# Patient Record
Sex: MALE | Race: ASIAN | URBAN - METROPOLITAN AREA
[De-identification: names, ages, dates, MRNs, and addresses within clinical notes are randomized per-mention and may not be internally consistent; named-entity substitution may affect disease eponyms.]

---

## 2020-05-01 ENCOUNTER — APPOINTMENT (OUTPATIENT)
Dept: GENERAL RADIOLOGY | Facility: HOSPITAL | Age: 30
End: 2020-05-01
Attending: EMERGENCY MEDICINE
Payer: OTHER GOVERNMENT

## 2020-05-01 ENCOUNTER — HOSPITAL ENCOUNTER (EMERGENCY)
Facility: HOSPITAL | Age: 30
Discharge: HOME | End: 2020-05-02
Attending: EMERGENCY MEDICINE
Payer: OTHER GOVERNMENT

## 2020-05-01 VITALS
SYSTOLIC BLOOD PRESSURE: 136 MMHG | OXYGEN SATURATION: 95 % | DIASTOLIC BLOOD PRESSURE: 64 MMHG | HEART RATE: 89 BPM | BODY MASS INDEX: 29.86 KG/M2 | HEIGHT: 68 IN | WEIGHT: 197 LBS | TEMPERATURE: 100 F | RESPIRATION RATE: 18 BRPM

## 2020-05-01 DIAGNOSIS — U07.1 COVID-19 VIRUS INFECTION: Primary | ICD-10-CM

## 2020-05-01 PROCEDURE — 71045 X-RAY EXAM CHEST 1 VIEW: CPT | Performed by: EMERGENCY MEDICINE

## 2020-05-01 PROCEDURE — 99453 REM MNTR PHYSIOL PARAM SETUP: CPT

## 2020-05-01 PROCEDURE — 99284 EMERGENCY DEPT VISIT MOD MDM: CPT

## 2020-05-01 RX ORDER — DOXYCYCLINE HYCLATE 100 MG/1
100 CAPSULE ORAL 2 TIMES DAILY
Qty: 20 CAPSULE | Refills: 0 | Status: SHIPPED | OUTPATIENT
Start: 2020-05-01 | End: 2020-05-11

## 2020-05-02 NOTE — ED INITIAL ASSESSMENT (HPI)
Patient here, known COVID positive c/o of increased SOB.   Patient last took tylenol at [facility].

## 2022-05-05 NOTE — ED PROVIDER NOTES
Patient Seen in: [facility] Emergency Department      History   Patient presents with:  Dyspnea CRISTA SOB  Cough/URI    Stated Complaint: cough sob    HPI    Was diagnosed with COVID-19 a few days ago via swab. Has had increasing fatigue, weakness. Start Maxitrol QID OS. I have personally visualized the Radiology studies and agree with interpretation from radiology. Xr Chest Ap Portable  (cpt=71045)    Result Date: 5/1/2020  PROCEDURE:  XR CHEST AP PORTABLE  (CPT=71045)  TECHNIQUE:  AP chest radiograph was obtained. of the instructions. Medications Prescribed:  Discharge Medication List as of 5/1/2020 11:54 PM    START taking these medications    Doxycycline Hyclate 100 MG Oral Cap  Take 1 capsule (100 mg total) by mouth 2 (two) times daily for 10 days. , Sandi

## 2024-07-13 ENCOUNTER — LAB ENCOUNTER (OUTPATIENT)
Dept: LAB | Age: 34
End: 2024-07-13
Attending: INTERNAL MEDICINE
Payer: COMMERCIAL

## 2024-07-13 ENCOUNTER — OFFICE VISIT (OUTPATIENT)
Dept: INTERNAL MEDICINE CLINIC | Facility: CLINIC | Age: 34
End: 2024-07-13
Payer: COMMERCIAL

## 2024-07-13 VITALS
OXYGEN SATURATION: 97 % | DIASTOLIC BLOOD PRESSURE: 74 MMHG | RESPIRATION RATE: 14 BRPM | SYSTOLIC BLOOD PRESSURE: 110 MMHG | TEMPERATURE: 98 F | HEART RATE: 61 BPM | HEIGHT: 67.52 IN | WEIGHT: 192 LBS | BODY MASS INDEX: 29.44 KG/M2

## 2024-07-13 DIAGNOSIS — Z00.00 ENCOUNTER FOR PREVENTATIVE ADULT HEALTH CARE EXAMINATION: Primary | ICD-10-CM

## 2024-07-13 DIAGNOSIS — Z00.00 ENCOUNTER FOR PREVENTATIVE ADULT HEALTH CARE EXAMINATION: ICD-10-CM

## 2024-07-13 DIAGNOSIS — R73.03 PREDIABETES: ICD-10-CM

## 2024-07-13 DIAGNOSIS — E78.2 MIXED HYPERLIPIDEMIA: ICD-10-CM

## 2024-07-13 DIAGNOSIS — Z23 NEED FOR DIPHTHERIA-TETANUS-PERTUSSIS (TDAP) VACCINE: ICD-10-CM

## 2024-07-13 PROBLEM — G47.00 INSOMNIA: Status: ACTIVE | Noted: 2023-02-21

## 2024-07-13 LAB
ALBUMIN SERPL-MCNC: 4.4 G/DL (ref 3.2–4.8)
ALBUMIN/GLOB SERPL: 1.6 {RATIO} (ref 1–2)
ALP LIVER SERPL-CCNC: 70 U/L
ALT SERPL-CCNC: 12 U/L
ANION GAP SERPL CALC-SCNC: 3 MMOL/L (ref 0–18)
AST SERPL-CCNC: 12 U/L (ref ?–34)
BASOPHILS # BLD AUTO: 0.06 X10(3) UL (ref 0–0.2)
BASOPHILS NFR BLD AUTO: 0.9 %
BILIRUB SERPL-MCNC: 0.9 MG/DL (ref 0.3–1.2)
BUN BLD-MCNC: 11 MG/DL (ref 9–23)
BUN/CREAT SERPL: 9.4 (ref 10–20)
CALCIUM BLD-MCNC: 9.2 MG/DL (ref 8.7–10.4)
CHLORIDE SERPL-SCNC: 108 MMOL/L (ref 98–112)
CHOLEST SERPL-MCNC: 148 MG/DL (ref ?–200)
CO2 SERPL-SCNC: 30 MMOL/L (ref 21–32)
CREAT BLD-MCNC: 1.17 MG/DL
DEPRECATED RDW RBC AUTO: 45 FL (ref 35.1–46.3)
EGFRCR SERPLBLD CKD-EPI 2021: 85 ML/MIN/1.73M2 (ref 60–?)
EOSINOPHIL # BLD AUTO: 0.12 X10(3) UL (ref 0–0.7)
EOSINOPHIL NFR BLD AUTO: 1.8 %
ERYTHROCYTE [DISTWIDTH] IN BLOOD BY AUTOMATED COUNT: 12.1 % (ref 11–15)
EST. AVERAGE GLUCOSE BLD GHB EST-MCNC: 120 MG/DL (ref 68–126)
FASTING PATIENT LIPID ANSWER: YES
FASTING STATUS PATIENT QL REPORTED: YES
GLOBULIN PLAS-MCNC: 2.7 G/DL (ref 2–3.5)
GLUCOSE BLD-MCNC: 113 MG/DL (ref 70–99)
HBA1C MFR BLD: 5.8 % (ref ?–5.7)
HCT VFR BLD AUTO: 38.4 %
HDLC SERPL-MCNC: 50 MG/DL (ref 40–59)
HGB BLD-MCNC: 13.3 G/DL
IMM GRANULOCYTES # BLD AUTO: 0.02 X10(3) UL (ref 0–1)
IMM GRANULOCYTES NFR BLD: 0.3 %
LDLC SERPL CALC-MCNC: 76 MG/DL (ref ?–100)
LYMPHOCYTES # BLD AUTO: 2.26 X10(3) UL (ref 1–4)
LYMPHOCYTES NFR BLD AUTO: 33.5 %
MCH RBC QN AUTO: 35 PG (ref 26–34)
MCHC RBC AUTO-ENTMCNC: 34.6 G/DL (ref 31–37)
MCV RBC AUTO: 101.1 FL
MONOCYTES # BLD AUTO: 0.46 X10(3) UL (ref 0.1–1)
MONOCYTES NFR BLD AUTO: 6.8 %
NEUTROPHILS # BLD AUTO: 3.82 X10 (3) UL (ref 1.5–7.7)
NEUTROPHILS # BLD AUTO: 3.82 X10(3) UL (ref 1.5–7.7)
NEUTROPHILS NFR BLD AUTO: 56.7 %
NONHDLC SERPL-MCNC: 98 MG/DL (ref ?–130)
OSMOLALITY SERPL CALC.SUM OF ELEC: 292 MOSM/KG (ref 275–295)
PLATELET # BLD AUTO: 302 10(3)UL (ref 150–450)
POTASSIUM SERPL-SCNC: 4.4 MMOL/L (ref 3.5–5.1)
PROT SERPL-MCNC: 7.1 G/DL (ref 5.7–8.2)
RBC # BLD AUTO: 3.8 X10(6)UL
SODIUM SERPL-SCNC: 141 MMOL/L (ref 136–145)
TRIGL SERPL-MCNC: 123 MG/DL (ref 30–149)
VLDLC SERPL CALC-MCNC: 19 MG/DL (ref 0–30)
WBC # BLD AUTO: 6.7 X10(3) UL (ref 4–11)

## 2024-07-13 PROCEDURE — 85025 COMPLETE CBC W/AUTO DIFF WBC: CPT

## 2024-07-13 PROCEDURE — 80053 COMPREHEN METABOLIC PANEL: CPT

## 2024-07-13 PROCEDURE — 80061 LIPID PANEL: CPT

## 2024-07-13 PROCEDURE — 83036 HEMOGLOBIN GLYCOSYLATED A1C: CPT

## 2024-07-13 NOTE — PATIENT INSTRUCTIONS
- Get blood tests done today  - Tetanus shot given today.  Will be due for next tetanus shot in 10 years  - Would target goal weight in the 180's    It was a pleasure seeing you in the clinic today.  Thank you for choosing the [facility] office for your healthcare needs. Please call at [phone] with any questions or concerns.    Etta Hines MD

## 2024-07-13 NOTE — PROGRESS NOTES
Cruz Talley is a 33 year old male.   HPI:   Patient presents for CPX/wellness examination and to establish care.    Diet: Consistent - tries to avoid fatty foods.  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   Exercise: Works night shifts - hard to work out during the daytime  Vision: No corrective lenses  Dental: Last cleaning was in April    Acute issues:  Had cold/URI symptoms a month ago - resolved now.    Chronic issues:  Had screening labs last spring - blood sugar/A1c in prediabetes range, lipids mildly elevated.    Surgeries: None  Family hx: Mother with HTN, HLP; father with HTN, CAD/HLP    Past medical, family, surgical and social history were reviewed and updated in chart.  REVIEW OF SYSTEMS:   GENERAL/ const: no fevers/chills, no unintentional weight loss  SKIN: denies any unusual skin lesions  EYES:no vision problems  HEENT: denies sinus pain or sinus tenderness  LUNGS: denies shortness of breath   CARDIOVASCULAR: denies chest pain  GI: denies nausea/emesis/ abdominal pain diarrhea constipation  : denies dysuria   MUSCULOSKELETAL: no arthralgias  NEURO: denies headaches  PSYCHIATRIC: denies issues  ENDOCRINE: no hot/cold intolerance  ALLERGY: No Known Allergies  PAST HISTORY:   No current outpatient medications on file.  Medical:  has no past medical history on file.  Surgical:  has no past surgical history on file.  Family: family history includes Heart Disorder in his father; Hypertension in his father and mother; Lipids in his father and mother.  Social:  reports that he has never smoked. He has never used smokeless tobacco. He reports that he does not currently use alcohol. He reports that he does not currently use drugs.  Wt Readings from Last 6 Encounters:   07/13/24 192 lb (87.1 kg)   05/01/20 197 lb (89.4 kg)     EXAM:   /74 (BP Location: Left arm, Patient Position: Sitting, Cuff Size: adult)   Pulse 61   Temp 98.1 °F (36.7 °C) (Temporal)   Resp 14   Ht 5' 7.52\"  (1.715 m)   Wt 192 lb (87.1 kg)   SpO2 97%   BMI 29.61 kg/m²   GENERAL: Alert and oriented, well developed, well nourished,in no apparent distress  SKIN: no rashes,no suspicious lesions  HEENT: atraumatic, PERRLA, EOMI, normal lid and conjunctiva, normal external canals and tympanic membranes bilaterally  NECK: supple, no jvd, no thyromegaly, no palpable/tender cervical lymphadenopathy  LUNGS: clear to auscultation bilaterally, no wheezing/rubs  CARDIO: RRR without murmurs.  No clubbing, cyanosis or edema.  GI: soft non tender nondistended no hepatosplenomegaly, bowel sounds throughout  NEURO: CN II-XII intact, 5/5 strength all extremities  MS: Full ROM, no joint pain  PSYCH: pleasant, appropriate mood and affect  ASSESSMENT AND PLAN:   1.  Encounter for preventative adult health examination  2. Need for diphtheria-tetanus-pertussis (Tdap) vaccine  Age appropriate health guidance and counseling provided.  Screening labs ordered as below.  Body mass index is 29.61 kg/m².  TDaP administered in office today.  - CBC With Differential With Platelet; Future  - Comp Metabolic Panel (14); Future  - Hemoglobin A1C; Future  - Lipid Panel; Future  - TdaP (Boostrix) Vaccine (> 7 Y)    3. Prediabetes  4. Mixed hyperlipidemia  Lifestyle controlled.  Labs ordered as below.  - Comp Metabolic Panel (14); Future  - Hemoglobin A1C; Future  - Lipid Panel; Future    Patient Care Team:  Etta Hines MD as PCP - General (Internal Medicine)  The patient indicates understanding of these issues and agrees to the plan.  The patient is asked to return to clinic in 12 months for follow up on chronic issues/CPX, or earlier if acute issues arise.    Etta Hines MD

## 2024-07-14 PROBLEM — R73.03 PREDIABETES: Status: ACTIVE | Noted: 2024-07-14

## 2024-12-10 ENCOUNTER — APPOINTMENT (OUTPATIENT)
Dept: GENERAL RADIOLOGY | Facility: HOSPITAL | Age: 34
End: 2024-12-10
Payer: COMMERCIAL

## 2024-12-10 ENCOUNTER — APPOINTMENT (OUTPATIENT)
Dept: CT IMAGING | Facility: HOSPITAL | Age: 34
End: 2024-12-10
Attending: EMERGENCY MEDICINE
Payer: COMMERCIAL

## 2024-12-10 ENCOUNTER — HOSPITAL ENCOUNTER (EMERGENCY)
Facility: HOSPITAL | Age: 34
Discharge: HOME | End: 2024-12-11
Attending: EMERGENCY MEDICINE
Payer: COMMERCIAL

## 2024-12-10 DIAGNOSIS — R03.0 ELEVATED BLOOD PRESSURE READING: Primary | ICD-10-CM

## 2024-12-10 LAB
ALBUMIN SERPL-MCNC: 4.3 G/DL (ref 3.2–4.8)
ALBUMIN/GLOB SERPL: 1.5 {RATIO} (ref 1–2)
ALP LIVER SERPL-CCNC: 70 U/L
ALT SERPL-CCNC: 15 U/L
ANION GAP SERPL CALC-SCNC: 5 MMOL/L (ref 0–18)
AST SERPL-CCNC: 13 U/L (ref ?–34)
BASOPHILS # BLD AUTO: 0.05 X10(3) UL (ref 0–0.2)
BASOPHILS NFR BLD AUTO: 0.7 %
BILIRUB SERPL-MCNC: 0.5 MG/DL (ref 0.3–1.2)
BUN BLD-MCNC: 12 MG/DL (ref 9–23)
CALCIUM BLD-MCNC: 9.8 MG/DL (ref 8.7–10.4)
CHLORIDE SERPL-SCNC: 107 MMOL/L (ref 98–112)
CO2 SERPL-SCNC: 27 MMOL/L (ref 21–32)
CREAT BLD-MCNC: 1.08 MG/DL
EGFRCR SERPLBLD CKD-EPI 2021: 93 ML/MIN/1.73M2 (ref 60–?)
EOSINOPHIL # BLD AUTO: 0.11 X10(3) UL (ref 0–0.7)
EOSINOPHIL NFR BLD AUTO: 1.5 %
ERYTHROCYTE [DISTWIDTH] IN BLOOD BY AUTOMATED COUNT: 12.2 %
GLOBULIN PLAS-MCNC: 2.9 G/DL (ref 2–3.5)
GLUCOSE BLD-MCNC: 98 MG/DL (ref 70–99)
HCT VFR BLD AUTO: 41.5 %
HGB BLD-MCNC: 14.1 G/DL
IMM GRANULOCYTES # BLD AUTO: 0.02 X10(3) UL (ref 0–1)
IMM GRANULOCYTES NFR BLD: 0.3 %
LYMPHOCYTES # BLD AUTO: 2.15 X10(3) UL (ref 1–4)
LYMPHOCYTES NFR BLD AUTO: 29.1 %
MCH RBC QN AUTO: 34.3 PG (ref 26–34)
MCHC RBC AUTO-ENTMCNC: 34 G/DL (ref 31–37)
MCV RBC AUTO: 101 FL
MONOCYTES # BLD AUTO: 0.51 X10(3) UL (ref 0.1–1)
MONOCYTES NFR BLD AUTO: 6.9 %
NEUTROPHILS # BLD AUTO: 4.54 X10 (3) UL (ref 1.5–7.7)
NEUTROPHILS # BLD AUTO: 4.54 X10(3) UL (ref 1.5–7.7)
NEUTROPHILS NFR BLD AUTO: 61.5 %
OSMOLALITY SERPL CALC.SUM OF ELEC: 288 MOSM/KG (ref 275–295)
PLATELET # BLD AUTO: 290 10(3)UL (ref 150–450)
POTASSIUM SERPL-SCNC: 4.2 MMOL/L (ref 3.5–5.1)
PROT SERPL-MCNC: 7.2 G/DL (ref 5.7–8.2)
RBC # BLD AUTO: 4.11 X10(6)UL
SODIUM SERPL-SCNC: 139 MMOL/L (ref 136–145)
TROPONIN I SERPL HS-MCNC: 3 NG/L
WBC # BLD AUTO: 7.4 X10(3) UL (ref 4–11)

## 2024-12-10 PROCEDURE — 80053 COMPREHEN METABOLIC PANEL: CPT | Performed by: EMERGENCY MEDICINE

## 2024-12-10 PROCEDURE — 99285 EMERGENCY DEPT VISIT HI MDM: CPT

## 2024-12-10 PROCEDURE — 36415 COLL VENOUS BLD VENIPUNCTURE: CPT

## 2024-12-10 PROCEDURE — 93005 ELECTROCARDIOGRAM TRACING: CPT

## 2024-12-10 PROCEDURE — 80053 COMPREHEN METABOLIC PANEL: CPT

## 2024-12-10 PROCEDURE — 85025 COMPLETE CBC W/AUTO DIFF WBC: CPT | Performed by: EMERGENCY MEDICINE

## 2024-12-10 PROCEDURE — 93010 ELECTROCARDIOGRAM REPORT: CPT

## 2024-12-10 PROCEDURE — 71045 X-RAY EXAM CHEST 1 VIEW: CPT

## 2024-12-10 PROCEDURE — 70450 CT HEAD/BRAIN W/O DYE: CPT | Performed by: EMERGENCY MEDICINE

## 2024-12-10 PROCEDURE — 84484 ASSAY OF TROPONIN QUANT: CPT | Performed by: EMERGENCY MEDICINE

## 2024-12-10 PROCEDURE — 85025 COMPLETE CBC W/AUTO DIFF WBC: CPT

## 2024-12-10 PROCEDURE — 84484 ASSAY OF TROPONIN QUANT: CPT

## 2024-12-10 RX ORDER — LOSARTAN POTASSIUM 25 MG/1
25 TABLET ORAL DAILY
Qty: 30 TABLET | Refills: 0 | Status: SHIPPED | OUTPATIENT
Start: 2024-12-10 | End: 2025-01-09

## 2024-12-11 VITALS
SYSTOLIC BLOOD PRESSURE: 119 MMHG | RESPIRATION RATE: 12 BRPM | BODY MASS INDEX: 28.79 KG/M2 | WEIGHT: 190 LBS | HEIGHT: 68 IN | HEART RATE: 51 BPM | TEMPERATURE: 97 F | DIASTOLIC BLOOD PRESSURE: 69 MMHG | OXYGEN SATURATION: 100 %

## 2024-12-11 NOTE — DISCHARGE INSTRUCTIONS
You may take your blood pressure medication that has been prescribed if you find if you have elevated blood pressure readings over 160 systolic

## 2024-12-11 NOTE — ED PROVIDER NOTES
Patient Seen in: [facility] Emergency Department      History     Chief Complaint   Patient presents with    Hypertension     Stated Complaint: Aches on left side/ HTN    Subjective:   Patient is a 34-year-old male presents emergency room for evaluation of elevated blood pressure reading today at work.  Patient had been feeling lightheaded and had checked his blood pressure at work with an automatic cuff reading over 170.  Patient has not seen his doctor in July of this year that time he had a blood pressure normally runs in the 120s.  Patient's blood pressure currently is 151/80.  He is in no distress.  But he has been under a great deal of stress lately with working night shift and changing computer etc.    The history is provided by the patient.             Objective:     History reviewed. No pertinent past medical history.           History reviewed. No pertinent surgical history.             Social History     Socioeconomic History    Marital status: Single   Tobacco Use    Smoking status: Never    Smokeless tobacco: Never   Vaping Use    Vaping status: Never Used   Substance and Sexual Activity    Alcohol use: Not Currently    Drug use: Not Currently     Social Drivers of Health     Financial Resource Strain: Not on File (2/21/2023)    Received from [facility]    Financial Resource Strain     Financial Resource Strain: 0   Food Insecurity: Not on File (9/26/2024)    Received from [facility]    Food Insecurity     Food: 0   Transportation Needs: Not on File (2/21/2023)    Received from [facility]    Transportation Needs     Transportation: 0   Physical Activity: Not on File (2/21/2023)    Received from [facility]    Physical Activity     Physical Activity: 0   Stress: Not on File (2/21/2023)    Received from [facility]    Stress     Stress: 0   Social Connections: Not on File (9/14/2024)    Received from [facility]    Social Connections     Connectedness: 0   Housing Stability: Not on File (2/21/2023)    Received from [facility]     Housing Stability     Housin                  Physical Exam     ED Triage Vitals [12/10/24 1939]   /78   Pulse 62   Resp 16   Temp 97.2 °F (36.2 °C)   Temp src    SpO2 98 %   O2 Device None (Room air)       Current Vitals:   Vital Signs  BP: 124/64  Pulse: 54  Resp: 14  Temp: 97.2 °F (36.2 °C)  MAP (mmHg): 81    Oxygen Therapy  SpO2: 97 %  O2 Device: None (Room air)        Physical Exam  Vitals and nursing note reviewed.   Constitutional:       General: He is not in acute distress.     Appearance: Normal appearance. He is normal weight. He is not toxic-appearing.   HENT:      Head: Normocephalic and atraumatic.      Mouth/Throat:      Mouth: Mucous membranes are moist.   Eyes:      Extraocular Movements: Extraocular movements intact.      Pupils: Pupils are equal, round, and reactive to light.   Cardiovascular:      Rate and Rhythm: Normal rate and regular rhythm.      Pulses: Normal pulses.   Pulmonary:      Effort: Pulmonary effort is normal.      Breath sounds: Normal breath sounds.   Abdominal:      General: Bowel sounds are normal. There is no distension.      Palpations: Abdomen is soft.      Tenderness: There is no abdominal tenderness.   Musculoskeletal:         General: Normal range of motion.   Skin:     General: Skin is warm.      Capillary Refill: Capillary refill takes less than 2 seconds.   Neurological:      General: No focal deficit present.      Mental Status: He is alert and oriented to person, place, and time.   Psychiatric:         Mood and Affect: Mood normal.         Behavior: Behavior normal.       56    ED Course     Labs Reviewed   CBC WITH DIFFERENTIAL WITH PLATELET - Abnormal; Notable for the following components:       Result Value    RBC 4.11 (*)     .0 (*)     MCH 34.3 (*)     All other components within normal limits   COMP METABOLIC PANEL (14) - Normal   TROPONIN I HIGH SENSITIVITY - Normal     EKG    Rate, intervals and axes as noted on EKG Report.  Rate: 56  Rhythm:  Sinus Rhythm bradycardia  Reading: Sinus bradycardia no ST elevation [place] interval of 138 S84 QTc of 353 with axes 52/58/31                XR CHEST AP PORTABLE  (CPT=71045)    Result Date: 12/10/2024  PROCEDURE:  XR CHEST AP PORTABLE  (CPT=71045)  TECHNIQUE:  AP chest radiograph was obtained.  COMPARISON:  [facility] , XR, XR CHEST AP PORTABLE  (CPT=71045), 5/01/2020, 10:29 PM.  INDICATIONS:  Aches on left side/ HTN  PATIENT STATED HISTORY: (As transcribed by Technologist)     FINDINGS:  The heart is normal in size.  The lungs are clear.  There are no pleural effusions.  The bones are unremarkable.            CONCLUSION:  No acute disease.    LOCATION:  [facility]      Dictated by (CST): Will Abdullahi MD on 12/10/2024 at 9:20 PM     Finalized by (CST): Will Abdullahi MD on 12/10/2024 at 9:20 PM        CT scan shows no evidence of acute intracranial abnormality no hemorrhage or mass ventricles sulci gray-white matter and bones are unremarkable.  Sinuses are unremarkable.  No CT criteria for an acute ischemic event however CT has diminished sensitivity for hyperacute stroke.     MDM      Social -negative tobacco, negative etoh, negative drugs  Family History-parents both with history of high blood pressure  Past Medical History-no significant past medical history    Differential diagnosis before testing included hypertension, anxiety, stress reaction, electrolyte abnormality, intracranial hemorrhage, acute coronary syndrome    Co-morbidities that add to the complexity of management include: None    Testing ordered during this visit included CT scan chest x-ray EKG 1 set of cardiac enzymes baseline labs    Radiographic images  I personally reviewed the radiographs and my individual interpretation shows chest x-ray and CT showed no acute process  I also reviewed the official reports that showed XR CHEST AP PORTABLE  (CPT=71045)    Result Date: 12/10/2024  PROCEDURE:  XR CHEST AP PORTABLE  (CPT=71045)  TECHNIQUE:  AP chest  radiograph was obtained.  COMPARISON:  [facility] , XR, XR CHEST AP PORTABLE  (CPT=71045), 5/01/2020, 10:29 PM.  INDICATIONS:  Aches on left side/ HTN  PATIENT STATED HISTORY: (As transcribed by Technologist)     FINDINGS:  The heart is normal in size.  The lungs are clear.  There are no pleural effusions.  The bones are unremarkable.            CONCLUSION:  No acute disease.    LOCATION:  [facility]      Dictated by (CST): Will Abdullahi MD on 12/10/2024 at 9:20 PM     Finalized by (CST): Will Abdullahi MD on 12/10/2024 at 9:20 PM        CT scan shows no evidence of acute intracranial abnormality no hemorrhage or mass ventricles sulci gray-white matter and bones are unremarkable.  Sinuses are unremarkable.  No CT criteria for an acute ischemic event however CT has diminished sensitivity for hyperacute stroke.       External chart review showed review of Care Everywhere in epic system shows no related comorbidities to current presentation, patient's most recent blood pressure that was checked by his primary doctor in July of this year was a systolic of 110    History obtained by an independent source included from patient,    Discussion of management with patient    Social determinants of health that affect care include patient reluctant to start blood pressure medication      Medications Provided: Losartan    Course of Events during Emergency Room Visit include 34-year-old male presents emergency room with episode of chest heaviness and dizziness that occurred today while at work.  Patient's blood pressure was measured with an automatic wrist cuff at that time stromally 170 systolic.  Areas to 150s.  Advised patient that iMac cuffs can be quite inaccurate particularly wrist cuffs.  I would recommend close follow-up with his primary doctor.  He is reluctant starting blood pressure medicine I would be happy to give him 1 for emergencies that he can discuss with his primary doctor in the meantime.    I rechecked the patient we  are still waiting for the CT to be read.  As of 214 the results are back.  Patient has no acute findings.  He is feeling much better blood pressure is down to 1/24/1963.  Be given a prescription for losartan that if the has elevation in his blood pressure readings at home he can take if his systolic is greater than 160.  Patient expresses understanding of these instructions.  He will follow-up with his primary care physician.      Disposition:        Discharge  I have discussed with the patient the results of test, differential diagnosis, treatment plan, warning signs and symptoms which should prompt immediate return.  They expressed understanding of these instructions and agrees to the following plan provided.  They were given written discharge instructions and agrees to return for any concerns and voiced understanding and all questions were answered.           Medical Decision Making      Disposition and Plan     Clinical Impression:  1. Elevated blood pressure reading         Disposition:  Discharge  12/11/2024  2:15 am    Follow-up:  Etta Hines MD  [address] [facility] [address]  [phone]    Schedule an appointment as soon as possible for a visit            Medications Prescribed:  Current Discharge Medication List        START taking these medications    Details   losartan 25 MG Oral Tab Take 1 tablet (25 mg total) by mouth daily.  Qty: 30 tablet, Refills: 0                 Supplementary Documentation:

## 2024-12-11 NOTE — ED INITIAL ASSESSMENT (HPI)
Patient reports he was driving and he felt an episode of dizziness and left sided chest heaviness that radiated into his left neck. This happened at noon today and he states he took his BP shortly after and it was 170 systolic. Also reports he had an episode of bright red blood in his stool on Sunday. Denies any blood thinners.denies any numbness or tingling, no facial droop noted, patient ambulatory with a steady gait, speech is clear.

## 2024-12-12 ENCOUNTER — OFFICE VISIT (OUTPATIENT)
Dept: INTERNAL MEDICINE CLINIC | Facility: CLINIC | Age: 34
End: 2024-12-12
Payer: COMMERCIAL

## 2024-12-12 VITALS
TEMPERATURE: 98 F | BODY MASS INDEX: 30.92 KG/M2 | DIASTOLIC BLOOD PRESSURE: 70 MMHG | HEIGHT: 68 IN | OXYGEN SATURATION: 100 % | HEART RATE: 70 BPM | WEIGHT: 204 LBS | SYSTOLIC BLOOD PRESSURE: 120 MMHG

## 2024-12-12 DIAGNOSIS — I10 PRIMARY HYPERTENSION: Primary | ICD-10-CM

## 2024-12-12 LAB
ATRIAL RATE: 56 BPM
P AXIS: 52 DEGREES
P-R INTERVAL: 138 MS
Q-T INTERVAL: 366 MS
QRS DURATION: 84 MS
QTC CALCULATION (BEZET): 353 MS
R AXIS: 58 DEGREES
T AXIS: 31 DEGREES
VENTRICULAR RATE: 56 BPM

## 2024-12-12 NOTE — PROGRESS NOTES
Subjective:   Cruz Talley is a 34 year old male who presents for hospital follow up.   He was discharged from Inpatient hospital, [facility] to Home   Admit Date: 12/10/11  Discharge Date: 12/10/11  Hospital Discharge Diagnosis: Hypertension    Interactive contact within 2 business days post discharge first initiated on Date: yes    I accessed [facility] and/or Care Everywhere and personally reviewed the following for the recent hospitalization: provider notes, consults, summaries, labs and other test results and the pertinent findings are documented below.     HPI:     The patient is 34-year-old man went to the emergency room because he felt nauseous, had diplopia And had high blood pressure.  The patient had CT scan of the brain and that was unremarkable.  Blood pressure initially was in 170s but later in the emergency room it was in 150s.  The patient was prescribed losartan 25 mg which he did not take it.  However, patient's blood pressure is in 120s since he was discharged.  Today, the patient's blood pressure is normal as well.    The patient has been experiencing lower back pain for last few days.  No bladder or bowel issues.  No weakness in the legs.    There is 1 episode of passing of blood in the stool few days ago.  Usually her bowels are regular and normal.    History/Other:   Current Medications:  Medication Reconciliation:  I am aware of an inpatient discharge within the last 30 days.  The discharge medication list has been reconciled with the patient's current medication list and reviewed by me. See medication list for additions of new medication, and changes to current doses of medications and discontinued medications.  Outpatient Medications Marked as Taking for the 12/12/24 encounter (Office Visit) with Serafin Jain MD   Medication Sig    losartan 25 MG Oral Tab Take 1 tablet (25 mg total) by mouth daily. (Patient taking differently: Take 1 tablet (25 mg total) by mouth as needed.)        Review of Systems:  GENERAL: weight stable, energy stable, no sweating  SKIN: denies any unusual skin lesions  EYES: denies blurred vision or double vision  HEENT: denies nasal congestion, sinus pain or ST  LUNGS: denies shortness of breath with exertion  CARDIOVASCULAR: denies chest pain on exertion or palpitations  GI: Blood in the stool  MUSCULOSKELETAL: Low back pain   NEURO: denies headaches, denies dizziness, denies weakness  PSYCHE: Some stress due to job related requirement.  The patient says that his job is very physical  HEMATOLOGIC: denies hx of anemia, or bruising, denies bleeding  ENDOCRINE: denies thyroid history  ALL/ASTHMA: denies hx of allergy or asthma    Objective:   No LMP for male patient.  Estimated body mass index is 31.02 kg/m² as calculated from the following:    Height as of this encounter: 5' 8\" (1.727 m).    Weight as of this encounter: 204 lb (92.5 kg).   /70 (BP Location: Right arm, Patient Position: Sitting, Cuff Size: adult)   Pulse 70   Temp 97.9 °F (36.6 °C) (Temporal)   Ht 5' 8\" (1.727 m)   Wt 204 lb (92.5 kg)   SpO2 100%   BMI 31.02 kg/m²    GENERAL: well developed, well nourished, in no apparent distress  SKIN: no rashes, no suspicious lesions  HEENT: atraumatic, normocephalic, ears and throat are clear  EYES: PERRLA, EOMI, conjunctiva are clear  NECK: supple, no adenopathy, no bruits  CHEST: no chest tenderness  LUNGS: clear to auscultation  CARDIO: RRR without murmur  GI: good BS's, no masses, rectal examination was done and it was unremarkable.  MUSCULOSKELETAL: back is not tender, FROM of the extremities.  Mild tenderness in the lumbar paraspinal area.  Straight leg raising test is negative.  EXTREMITIES: no cyanosis, clubbing or edema  NEURO: Oriented times three, cranial nerves are intact, motor and sensory are grossly intact    Assessment & Plan:     1.  Elevated blood pressure: Currently the blood pressure is normal.  The patient has not been taking  losartan.  Will watch without antihypertensive.  If blood pressure remains below 130/80, there is no need for antihypertensive medications.    2.  Class I obesity: Discussed about Twenga and strategies to spend energy.    3.  Hematochezia: Most likely secondary to internal hemorrhoids.  Plenty of fluid and fibers diet was advised.  Rectal examination was unremarkable.  If persistent symptoms, I will refer the patient to the GI physician.  I have advised the patient to let me know if that is the case.    4.  Mechanical low back pain: Continue with the stretching exercises.  As needed over-the-counter pain medications.  If pain persists, I will refer the patient to physical therapy and consider imaging of lower back.      No follow-ups on file.

## 2025-08-25 ENCOUNTER — TELEPHONE (OUTPATIENT)
Dept: INTERNAL MEDICINE CLINIC | Facility: CLINIC | Age: 35
End: 2025-08-25

## 2025-08-25 DIAGNOSIS — Z00.00 PREVENTATIVE HEALTH CARE: ICD-10-CM

## 2025-08-25 DIAGNOSIS — R73.03 PREDIABETES: Primary | ICD-10-CM

## (undated) NOTE — LETTER
Date: 12/12/2024    Patient Name: Cruz Talley          To Whom it may concern:    This letter has been written at the patient's request. The above patient was seen at [facility] for treatment of a medical condition.    This patient should be excused from attending work/school from 12/10/24 through 12/12/24.    The patient may return to work/school on 12/13/24.      Sincerely,    Serafin Jain MD

## (undated) NOTE — LETTER
Date & Time: 12/11/2024, 2:21 AM  Patient: Cruz Talley  Encounter Provider(s):    Mitali Hill DO       To Whom It May Concern:    Cruz Talley was seen and treated in our department on 12/10/2024. He should not return to work until 12/12/24 .    If you have any questions or concerns, please do not hesitate to call.        _____________________________  Physician/APC Signature